# Patient Record
Sex: FEMALE
[De-identification: names, ages, dates, MRNs, and addresses within clinical notes are randomized per-mention and may not be internally consistent; named-entity substitution may affect disease eponyms.]

---

## 2019-02-14 ENCOUNTER — RX ONLY (RX ONLY)
Age: 34
End: 2019-02-14

## 2019-02-14 ENCOUNTER — ACNE/ROSACEA (OUTPATIENT)
Dept: URBAN - METROPOLITAN AREA CLINIC 36 | Facility: CLINIC | Age: 34
Setting detail: DERMATOLOGY
End: 2019-02-14

## 2019-02-14 DIAGNOSIS — L57.0 ACTINIC KERATOSIS: ICD-10-CM

## 2019-02-14 PROCEDURE — 99202 OFFICE O/P NEW SF 15 MIN: CPT

## 2019-02-14 RX ORDER — DAPSONE 75 MG/G
1 APPLICATION GEL TOPICAL DAILY
Qty: 90 | Refills: 2 | Status: DISCONTINUED
Start: 2019-02-14 | End: 2020-07-08

## 2019-03-05 ENCOUNTER — RX ONLY (RX ONLY)
Age: 34
End: 2019-03-05

## 2019-03-05 RX ORDER — TRETINOIN 0.5 MG/G
1 APPLICATION CREAM TOPICAL NIGHTLY
Qty: 45 | Refills: 1 | Status: DISCONTINUED
Start: 2019-03-05 | End: 2020-07-08

## 2019-03-05 RX ORDER — TRETINOIN 0.5 MG/G
1 APPLICATION CREAM TOPICAL NIGHTLY
Qty: 45 | Refills: 1 | Status: DISCONTINUED
Start: 2019-03-05 | End: 2019-03-05

## 2019-03-15 ENCOUNTER — RX ONLY (RX ONLY)
Age: 34
End: 2019-03-15

## 2019-03-15 RX ORDER — SPIRONOLACTONE 50 MG/1
1 TABLET TABLET, FILM COATED ORAL BID
Qty: 60 | Refills: 1
Start: 2019-03-15

## 2019-04-08 ENCOUNTER — RX ONLY (RX ONLY)
Age: 34
End: 2019-04-08

## 2019-04-08 RX ORDER — SPIRONOLACTONE 50 MG/1
1 TABLET TABLET, FILM COATED ORAL BID
Qty: 180 | Refills: 0
Start: 2019-04-08

## 2020-07-08 ENCOUNTER — RX ONLY (RX ONLY)
Age: 35
End: 2020-07-08

## 2020-07-08 ENCOUNTER — SEE NOTE (OUTPATIENT)
Dept: URBAN - METROPOLITAN AREA CLINIC 36 | Facility: CLINIC | Age: 35
Setting detail: DERMATOLOGY
End: 2020-07-08

## 2020-07-08 DIAGNOSIS — L81.8 OTHER SPECIFIED DISORDERS OF PIGMENTATION: ICD-10-CM

## 2020-07-08 DIAGNOSIS — D22.5 MELANOCYTIC NEVI OF TRUNK: ICD-10-CM

## 2020-07-08 PROCEDURE — 99213 OFFICE O/P EST LOW 20 MIN: CPT

## 2022-04-29 ENCOUNTER — ACNE/ROSACEA (OUTPATIENT)
Dept: URBAN - METROPOLITAN AREA CLINIC 36 | Facility: CLINIC | Age: 37
Setting detail: DERMATOLOGY
End: 2022-04-29

## 2022-04-29 ENCOUNTER — RX ONLY (RX ONLY)
Age: 37
End: 2022-04-29

## 2022-04-29 DIAGNOSIS — L57.0 ACTINIC KERATOSIS: ICD-10-CM

## 2022-04-29 PROCEDURE — 99214 OFFICE O/P EST MOD 30 MIN: CPT

## 2022-04-29 RX ORDER — DOXYCYCLINE HYCLATE 100 MG/1
1 CAPSULE CAPSULE, GELATIN COATED ORAL ONCE A DAY
Qty: 30 | Refills: 0
Start: 2022-04-29

## 2022-04-29 RX ORDER — TRETINOIN 0.5 MG/G
1 APPLICATION CREAM TOPICAL NIGHTLY
Qty: 45 | Refills: 2
Start: 2022-04-29

## 2022-04-29 RX ORDER — SPIRONOLACTONE 50 MG/1
1 TABLET TABLET, FILM COATED ORAL TWICE A DAY
Qty: 60 | Refills: 3
Start: 2022-04-29

## 2022-06-08 ENCOUNTER — RX ONLY (RX ONLY)
Age: 37
End: 2022-06-08

## 2022-06-08 RX ORDER — SPIRONOLACTONE 50 MG/1
1 TABLET TABLET, FILM COATED ORAL TWICE A DAY
Qty: 180 | Refills: 3
Start: 2022-06-08

## 2022-12-21 ENCOUNTER — APPOINTMENT (OUTPATIENT)
Dept: URBAN - METROPOLITAN AREA CLINIC 207 | Age: 37
Setting detail: DERMATOLOGY
End: 2022-12-21

## 2022-12-21 DIAGNOSIS — L82.0 INFLAMED SEBORRHEIC KERATOSIS: ICD-10-CM

## 2022-12-21 DIAGNOSIS — L81.0 POSTINFLAMMATORY HYPERPIGMENTATION: ICD-10-CM

## 2022-12-21 DIAGNOSIS — L70.0 ACNE VULGARIS: ICD-10-CM

## 2022-12-21 PROCEDURE — OTHER DEFER: OTHER

## 2022-12-21 PROCEDURE — OTHER PRESCRIPTION: OTHER

## 2022-12-21 PROCEDURE — OTHER PRESCRIPTION MEDICATION MANAGEMENT: OTHER

## 2022-12-21 PROCEDURE — OTHER COUNSELING: OTHER

## 2022-12-21 PROCEDURE — 99214 OFFICE O/P EST MOD 30 MIN: CPT

## 2022-12-21 RX ORDER — SPIRONOLACTONE 100 MG/1
TABLET, FILM COATED ORAL TWICE DAILY
Qty: 180 | Refills: 3 | Status: ERX | COMMUNITY
Start: 2022-12-21

## 2022-12-21 ASSESSMENT — LOCATION DETAILED DESCRIPTION DERM
LOCATION DETAILED: INFERIOR MID FOREHEAD
LOCATION DETAILED: RIGHT CENTRAL MALAR CHEEK
LOCATION DETAILED: LEFT INFERIOR CENTRAL MALAR CHEEK
LOCATION DETAILED: RIGHT INFERIOR CENTRAL MALAR CHEEK

## 2022-12-21 ASSESSMENT — LOCATION SIMPLE DESCRIPTION DERM
LOCATION SIMPLE: RIGHT CHEEK
LOCATION SIMPLE: INFERIOR FOREHEAD
LOCATION SIMPLE: LEFT CHEEK

## 2022-12-21 ASSESSMENT — LOCATION ZONE DERM: LOCATION ZONE: FACE

## 2022-12-21 NOTE — HPI: EVALUATION OF SKIN LESION(S)
What Type Of Note Output Would You Prefer (Optional)?: Standard Output
How Severe Are Your Spot(S)?: moderate
Hpi Title: Evaluation of a Skin Lesion
Additional History: Patient states spot was a zit in September but has not healed since\\n-she has tried laser treatment from her  (unknown what laser)\\n-also has tried applying Tretinoin on spot, does not seem to help\\n\\nPatient has been seen by us for acne (last ov in April 2022) she is on Spironolactone 50mg BID, Doxycycline 100mg QD, and Tretinoin .05% cream

## 2022-12-21 NOTE — PROCEDURE: PRESCRIPTION MEDICATION MANAGEMENT
Continue Regimen: *Increasing dose - Spironolactone 100mg - twice daily with food and water\\nTretinoin .05% cream - pea sized amount to face, nightly as tolerable

## 2022-12-21 NOTE — PROCEDURE: PRESCRIPTION MEDICATION MANAGEMENT
Initiate Treatment: A [facility] : [place] : hydroquinone 8% / kojic acid 6% (8% hydroquinone, 6% kojic acid (bulk) in CeraVe)\\n—Apply directly to dark marks on face, twice daily for two months, wash off in the morning. \\nRX sent to [facility] Initiate Treatment: A Sabina : [place] : hydroquinone 8% / kojic acid 6% (8% hydroquinone, 6% kojic acid (bulk) in CeraVe)\\n—Apply directly to dark marks on face, twice daily for two months, wash off in the morning. \\nRX sent to [facility]

## 2022-12-21 NOTE — PROCEDURE: COUNSELING
Detail Level: Detailed
High Dose Vitamin A Counseling: Side effects reviewed, pt to contact office should one occur.
Tetracycline Pregnancy And Lactation Text: This medication is Pregnancy Category D and not consider safe during pregnancy. It is also excreted in breast milk.
Sarecycline Counseling: Patient advised regarding possible photosensitivity and discoloration of the teeth, skin, lips, tongue and gums.  Patient instructed to avoid sunlight, if possible.  When exposed to sunlight, patients should wear protective clothing, sunglasses, and sunscreen.  The patient was instructed to call the office immediately if the following severe adverse effects occur:  hearing changes, easy bruising/bleeding, severe headache, or vision changes.  The patient verbalized understanding of the proper use and possible adverse effects of sarecycline.  All of the patient's questions and concerns were addressed.
Tazorac Counseling:  Patient advised that medication is irritating and drying.  Patient may need to apply sparingly and wash off after an hour before eventually leaving it on overnight.  The patient verbalized understanding of the proper use and possible adverse effects of tazorac.  All of the patient's questions and concerns were addressed.
Birth Control Pills Pregnancy And Lactation Text: This medication should be avoided if pregnant and for the first 30 days post-partum.
Azelaic Acid Pregnancy And Lactation Text: This medication is considered safe during pregnancy and breast feeding.
Topical Sulfur Applications Counseling: Topical Sulfur Counseling: Patient counseled that this medication may cause skin irritation or allergic reactions.  In the event of skin irritation, the patient was advised to reduce the amount of the drug applied or use it less frequently.   The patient verbalized understanding of the proper use and possible adverse effects of topical sulfur application.  All of the patient's questions and concerns were addressed.
Doxycycline Pregnancy And Lactation Text: This medication is Pregnancy Category D and not consider safe during pregnancy. It is also excreted in breast milk but is considered safe for shorter treatment courses.
Benzoyl Peroxide Counseling: Patient counseled that medicine may cause skin irritation and bleach clothing.  In the event of skin irritation, the patient was advised to reduce the amount of the drug applied or use it less frequently.   The patient verbalized understanding of the proper use and possible adverse effects of benzoyl peroxide.  All of the patient's questions and concerns were addressed.
Azithromycin Counseling:  I discussed with the patient the risks of azithromycin including but not limited to GI upset, allergic reaction, drug rash, diarrhea, and yeast infections.
Topical Sulfur Applications Pregnancy And Lactation Text: This medication is Pregnancy Category C and has an unknown safety profile during pregnancy. It is unknown if this topical medication is excreted in breast milk.
Use Enhanced Medication Counseling?: Yes
Dapsone Counseling: I discussed with the patient the risks of dapsone including but not limited to hemolytic anemia, agranulocytosis, rashes, methemoglobinemia, kidney failure, peripheral neuropathy, headaches, GI upset, and liver toxicity.  Patients who start dapsone require monitoring including baseline LFTs and weekly CBCs for the first month, then every month thereafter.  The patient verbalized understanding of the proper use and possible adverse effects of dapsone.  All of the patient's questions and concerns were addressed.
Aklief counseling:  Patient advised to apply a pea-sized amount only at bedtime and wait 30 minutes after washing their face before applying.  If too drying, patient may add a non-comedogenic moisturizer.  The most commonly reported side effects including irritation, redness, scaling, dryness, stinging, burning, itching, and increased risk of sunburn.  The patient verbalized understanding of the proper use and possible adverse effects of retinoids.  All of the patient's questions and concerns were addressed.
Tazorac Pregnancy And Lactation Text: This medication is not safe during pregnancy. It is unknown if this medication is excreted in breast milk.
Include Pregnancy/Lactation Warning?: No
Azithromycin Pregnancy And Lactation Text: This medication is considered safe during pregnancy and is also secreted in breast milk.
Spironolactone Counseling: Patient advised regarding risks of  breast tenderness, abnormal periods, abdominal pain, and hyperkalemia.  Theoretically the medication can feminize a male fetus, so patient was counselled to discontinue use of medication if pregnant. The patient may need blood work to monitor liver and kidney function and potassium levels while on therapy if patient is on another medication that encourages kidneys to hold on to potassium in the body.  The patient verbalized understanding of the proper use and possible adverse effects of spironolactone.  All of the patient's questions and concerns were addressed.
Benzoyl Peroxide Pregnancy And Lactation Text: This medication is Pregnancy Category C. It is unknown if benzoyl peroxide is excreted in breast milk.
Erythromycin Counseling:  I discussed with the patient the risks of erythromycin including but not limited to GI upset, allergic reaction, drug rash, diarrhea, increase in liver enzymes, and yeast infections.
Winlevi Counseling:  I discussed with the patient the risks of topical clascoterone including but not limited to erythema, scaling, itching, and stinging. Patient voiced their understanding.
High Dose Vitamin A Pregnancy And Lactation Text: High dose vitamin A therapy is contraindicated during pregnancy and breast feeding.
Winlevi Pregnancy And Lactation Text: This medication is considered safe during pregnancy and breastfeeding.
Minocycline Counseling: Patient advised regarding possible photosensitivity and discoloration of the teeth, skin, lips, tongue and gums.  Patient instructed to avoid sunlight, if possible.  When exposed to sunlight, patients should wear protective clothing, sunglasses, and sunscreen.  The patient was instructed to call the office immediately if the following severe adverse effects occur:  hearing changes, easy bruising/bleeding, severe headache, or vision changes.  The patient verbalized understanding of the proper use and possible adverse effects of minocycline.  All of the patient's questions and concerns were addressed.
Topical Clindamycin Counseling: Patient counseled that this medication may cause skin irritation or allergic reactions.  In the event of skin irritation, the patient was advised to reduce the amount of the drug applied or use it less frequently.   The patient verbalized understanding of the proper use and possible adverse effects of clindamycin.  All of the patient's questions and concerns were addressed.
Isotretinoin Counseling: Patient should get monthly blood tests, not donate blood, not drive at night if vision affected, not share medication, and not undergo elective surgery for 6 months after tx completed. Side effects reviewed, pt to contact office should one occur.
Aklief Pregnancy And Lactation Text: It is unknown if this medication is safe to use during pregnancy.  It is unknown if this medication is excreted in breast milk.  Breastfeeding women should use the topical cream on the smallest area of the skin for the shortest time needed while breastfeeding.  Do not apply to nipple and areola.
Spironolactone Pregnancy And Lactation Text: This medication can cause feminization of the male fetus and should be avoided during pregnancy. The active metabolite is also found in breast milk.
Erythromycin Pregnancy And Lactation Text: This medication is Pregnancy Category B and is considered safe during pregnancy. It is also excreted in breast milk.
Bactrim Counseling:  I discussed with the patient the risks of sulfa antibiotics including but not limited to GI upset, allergic reaction, drug rash, diarrhea, dizziness, photosensitivity, and yeast infections.  Rarely, more serious reactions can occur including but not limited to aplastic anemia, agranulocytosis, methemoglobinemia, blood dyscrasias, liver or kidney failure, lung infiltrates or desquamative/blistering drug rashes.
Topical Retinoid counseling:  Patient advised to wash face with a gentle cleanser, Apply a non-comedogenic moisturizer like Cereve PM first all over face , then apply a chocolate chip size amount of the medication only at bedtime after the moisturizer dries a few minutes.  Start three nights a week the first week, then if no irritation, increase to every other night the next night, then if no irritation increase to every single night. When and if you are able to tolerate the medication every night, you can get frisky and put your medication on first and moisturizer on next. Skip a night or two and apply only moisturizer if irritation develops at anytime and go back a step. The goal is to apply the medication every night or at least every other night on top of moisturizer. Patient verbalized understanding of the proper use and possible adverse effects of retinoids.  All of the patient's questions and concerns were addressed.
Dapsone Pregnancy And Lactation Text: This medication is Pregnancy Category C and is not considered safe during pregnancy or breast feeding.
Topical Retinoid Pregnancy And Lactation Text: This medication is Pregnancy Category C. It is unknown if this medication is excreted in breast milk.
Azelaic Acid Counseling: Patient counseled that medicine may cause skin irritation and to avoid applying near the eyes.  In the event of skin irritation, the patient was advised to reduce the amount of the drug applied or use it less frequently.   The patient verbalized understanding of the proper use and possible adverse effects of azelaic acid.  All of the patient's questions and concerns were addressed.
Isotretinoin Pregnancy And Lactation Text: This medication is Pregnancy Category X and is considered extremely dangerous during pregnancy. It is unknown if it is excreted in breast milk.
Topical Clindamycin Pregnancy And Lactation Text: This medication is Pregnancy Category B and is considered safe during pregnancy. It is unknown if it is excreted in breast milk.
Birth Control Pills Counseling: Birth Control Pill Counseling: I discussed with the patient the potential side effects of OCPs including but not limited to increased risk of stroke, heart attack, thrombophlebitis, deep venous thrombosis, hepatic adenomas, breast changes, GI upset, headaches, and depression.  The patient verbalized understanding of the proper use and possible adverse effects of OCPs. All of the patient's questions and concerns were addressed.
Bactrim Pregnancy And Lactation Text: This medication is Pregnancy Category D and is known to cause fetal risk.  It is also excreted in breast milk.
Tetracycline Counseling: Patient counseled regarding possible photosensitivity and increased risk for sunburn.  Patient instructed to avoid sunlight, if possible.  When exposed to sunlight, patients should wear protective clothing, sunglasses, and sunscreen.  The patient was instructed to call the office immediately if the following severe adverse effects occur:  hearing changes, easy bruising/bleeding, severe headache, or vision changes.  The patient verbalized understanding of the proper use and possible adverse effects of tetracycline.  All of the patient's questions and concerns were addressed. Patient understands to avoid pregnancy while on therapy due to potential birth defects.
Doxycycline Counseling:  Patient counseled regarding possible photosensitivity and increased risk for sunburn.  Patient instructed to avoid sunlight, if possible.  When exposed to sunlight, patients should wear protective clothing, sunglasses, and sunscreen.  The patient was instructed to call the office immediately if the following severe adverse effects occur:  hearing changes, easy bruising/bleeding, severe headache, or vision changes.  The patient verbalized understanding of the proper use and possible adverse effects of doxycycline.  All of the patient's questions and concerns were addressed.

## 2022-12-21 NOTE — PROCEDURE: DEFER
Reason To Defer Override: PIH appears underneath ISK, plan to treat PIH and RTC for follow up appointment to treat ISK with LN2
Introduction Text (Please End With A Colon): The following procedure was deferred:
Size Of Lesion In Cm (Optional): 0
Detail Level: Detailed

## 2023-12-01 ENCOUNTER — TELEPHONE ENCOUNTER (OUTPATIENT)
Dept: URBAN - METROPOLITAN AREA CLINIC 92 | Facility: CLINIC | Age: 38
End: 2023-12-01

## 2023-12-01 ENCOUNTER — DASHBOARD ENCOUNTERS (OUTPATIENT)
Age: 38
End: 2023-12-01

## 2023-12-01 ENCOUNTER — OFFICE VISIT (OUTPATIENT)
Dept: URBAN - METROPOLITAN AREA CLINIC 92 | Facility: CLINIC | Age: 38
End: 2023-12-01
Payer: COMMERCIAL

## 2023-12-01 VITALS
DIASTOLIC BLOOD PRESSURE: 70 MMHG | HEIGHT: 62 IN | WEIGHT: 147 LBS | TEMPERATURE: 96.6 F | SYSTOLIC BLOOD PRESSURE: 116 MMHG | HEART RATE: 80 BPM | BODY MASS INDEX: 27.05 KG/M2

## 2023-12-01 DIAGNOSIS — K59.04 CHRONIC IDIOPATHIC CONSTIPATION: ICD-10-CM

## 2023-12-01 DIAGNOSIS — R63.2 BINGE EATING: ICD-10-CM

## 2023-12-01 DIAGNOSIS — K64.4 HEMORRHOIDS, EXTERNAL: ICD-10-CM

## 2023-12-01 DIAGNOSIS — R14.2 BELCHING: ICD-10-CM

## 2023-12-01 DIAGNOSIS — R14.0 BLOATING: ICD-10-CM

## 2023-12-01 PROBLEM — 82934008: Status: ACTIVE | Noted: 2023-12-01

## 2023-12-01 PROBLEM — 1162387003: Status: ACTIVE | Noted: 2023-12-01

## 2023-12-01 PROCEDURE — 99204 OFFICE O/P NEW MOD 45 MIN: CPT

## 2023-12-01 RX ORDER — HYDROCORTISONE 25 MG/G
1 APPLICATION CREAM TOPICAL TWICE A DAY
Qty: 1 | Refills: 0 | OUTPATIENT
Start: 2023-12-01 | End: 2023-12-11

## 2023-12-01 RX ORDER — LINACLOTIDE 145 UG/1
1 CAPSULE AT LEAST 30 MINUTES BEFORE THE FIRST MEAL OF THE DAY ON AN EMPTY STOMACH CAPSULE, GELATIN COATED ORAL ONCE A DAY
Qty: 90 | Refills: 1 | OUTPATIENT
Start: 2023-12-01 | End: 2024-05-29

## 2023-12-01 NOTE — HPI-TODAY'S VISIT:
38-year-old female patient presents today due to constipation.  She was referred to us by Dr. Racheal Zepeda copy of this note will be sent to referring provider.   She has had issues with constipation since childhood and was previously on Adderall which would help her move her bowels.  She has since gotten off of Adderall and is currently on Wegovy and has noted worsening constipation. She has binge eating disorder and Wegovy helps with this. She has never seen psych before.  She has a bowel movement every 3 to 4 days and has to use over-the-counter products to help her go.  Currently, she is using magnesium every other day but this causes her stomach to be liquid.  She has tried smooth move tea's, MiraLAX, fiber, cement laxatives none of which helped.  Due to her increased constipation she is also having issues with hemorrhoids and has constant rectal itching.  She has tried witch hazel and Preparation H over-the-counter which helps but hemorrhoids return.  She has no rectal pain or bleeding.  She denies any abdominal pain, melena or weight loss.  No family history of any GI related conditions or cancers. She notes increased belching and frequently uses straws as well as eats very fast.  She denies any GERD symptoms, dysphagia or odynophagia.  She does have nausea from Wegovy and is on as needed Zofran.  She denies any NSAID use. Blood work with PCP over 1 year ago.

## 2023-12-04 ENCOUNTER — TELEPHONE ENCOUNTER (OUTPATIENT)
Dept: URBAN - METROPOLITAN AREA CLINIC 92 | Facility: CLINIC | Age: 38
End: 2023-12-04

## 2023-12-04 LAB
A/G RATIO: 1.6
ABSOLUTE BASOPHILS: 22
ABSOLUTE EOSINOPHILS: 50
ABSOLUTE LYMPHOCYTES: 1656
ABSOLUTE MONOCYTES: 281
ABSOLUTE NEUTROPHILS: 3493
ALBUMIN: 4.1
ALKALINE PHOSPHATASE: 55
ALT (SGPT): 15
AST (SGOT): 14
BASOPHILS: 0.4
BILIRUBIN, TOTAL: 0.6
BUN/CREATININE RATIO: (no result)
BUN: 9
CALCIUM: 9
CARBON DIOXIDE, TOTAL: 22
CHLORIDE: 109
CREATININE: 0.71
EGFR: 112
EOSINOPHILS: 0.9
GLOBULIN, TOTAL: 2.6
GLUCOSE: 80
HEMATOCRIT: 40.7
HEMOGLOBIN: 13.6
IMMUNOGLOBULIN A, QN, SERUM: 210
INTERPRETATION: (no result)
LYMPHOCYTES: 30.1
MCH: 29.8
MCHC: 33.4
MCV: 89.3
MONOCYTES: 5.1
MPV: 9.3
NEUTROPHILS: 63.5
PLATELET COUNT: 352
POTASSIUM: 5
PROTEIN, TOTAL: 6.7
RDW: 12.2
RED BLOOD CELL COUNT: 4.56
SODIUM: 141
T-TRANSGLUTAMINASE (TTG) IGA: <1
TSH W/REFLEX TO FT4: 1.43
WHITE BLOOD CELL COUNT: 5.5

## 2024-02-01 ENCOUNTER — OV EP (OUTPATIENT)
Dept: URBAN - METROPOLITAN AREA CLINIC 92 | Facility: CLINIC | Age: 39
End: 2024-02-01